# Patient Record
Sex: MALE | HISPANIC OR LATINO | URBAN - NONMETROPOLITAN AREA
[De-identification: names, ages, dates, MRNs, and addresses within clinical notes are randomized per-mention and may not be internally consistent; named-entity substitution may affect disease eponyms.]

---
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
## 2021-06-08 ENCOUNTER — IMPORTED ENCOUNTER (OUTPATIENT)
Dept: URBAN - NONMETROPOLITAN AREA CLINIC 1 | Facility: CLINIC | Age: 21
End: 2021-06-08

## 2021-06-08 PROCEDURE — S0621 ROUTINE OPHTHALMOLOGICAL EXA: HCPCS

## 2021-06-08 NOTE — PATIENT DISCUSSION
Compound Myopic Astigmatism OU-  discussed findings w/patient-  new spectacle Rx issued-  continue to monitor yearly or prnRetinopathy of Prematurity OU-  discussed findings w/patient-  no changes noted at this time stable -  continue to f/u as per Dr. Walt Liu-  RTC 1 year or prn; 's Notes: MR 6/8/2021DFE 6/8/2021

## 2021-07-14 ENCOUNTER — IMPORTED ENCOUNTER (OUTPATIENT)
Dept: URBAN - NONMETROPOLITAN AREA CLINIC 1 | Facility: CLINIC | Age: 21
End: 2021-07-14

## 2021-07-14 PROBLEM — H52.223: Noted: 2021-07-14

## 2021-07-14 PROBLEM — H52.13: Noted: 2021-07-14

## 2021-07-14 PROBLEM — H10.423: Noted: 2021-07-14

## 2021-07-14 PROBLEM — H35.103: Noted: 2021-07-14

## 2021-07-14 PROBLEM — H35.101: Noted: 2021-07-14

## 2021-07-14 PROBLEM — H35.102: Noted: 2021-07-14

## 2021-07-14 PROCEDURE — 92012 INTRM OPH EXAM EST PATIENT: CPT

## 2021-07-14 NOTE — PATIENT DISCUSSION
Ocular Allergies OS>OD-  discussed findings w/patient-  start Pataday OTC at least BID OU-  no further treatment at this time-  continue to monitor as scheduled or prn; 's Notes: MR 6/8/2021DFE 6/8/2021

## 2022-04-09 ASSESSMENT — VISUAL ACUITY
OD_SC: 20/30
OS_SC: 20/40
OS_SC: 20/30
OU_SC: 20/30
OD_SC: 20/25
OD_SC: 20/30+2
OD_SC: 20/25-1
OS_SC: 20/40
OU_CC: J1+
OS_SC: 20/40

## 2022-04-09 ASSESSMENT — TONOMETRY
OS_IOP_MMHG: 13
OD_IOP_MMHG: 15
OD_IOP_MMHG: 12
OS_IOP_MMHG: 12
OD_IOP_MMHG: 13
OS_IOP_MMHG: 15

## 2022-07-28 ENCOUNTER — COMPREHENSIVE EXAM (OUTPATIENT)
Dept: URBAN - NONMETROPOLITAN AREA CLINIC 4 | Facility: CLINIC | Age: 22
End: 2022-07-28

## 2022-07-28 DIAGNOSIS — H52.223: ICD-10-CM

## 2022-07-28 DIAGNOSIS — H52.13: ICD-10-CM

## 2022-07-28 PROCEDURE — S0621 ROUTINE OPHTHALMOLOGICAL EXA: HCPCS

## 2022-07-28 ASSESSMENT — VISUAL ACUITY
OU_CC: 20/25+2
OD_CC: 20/25-2
OS_CC: 20/30

## 2022-07-28 ASSESSMENT — TONOMETRY
OS_IOP_MMHG: 15
OD_IOP_MMHG: 15

## 2022-07-28 NOTE — PATIENT DISCUSSION
Ocular Allergies OS>OD-  discussed findings w/patient-  start Pataday OTC at least BID OU-  no further treatment at this time-  continue to monitor as scheduled or prn; 's Notes: MR 6/8/2021DFE 6/8/2021.

## 2022-07-28 NOTE — PATIENT DISCUSSION
Discussed the use of warm compresses and AT's. Advised patient that if warm compresses and AT's no longer help and is experiencing discomfort to please let us know.

## 2024-01-31 ENCOUNTER — ESTABLISHED PATIENT (OUTPATIENT)
Dept: URBAN - NONMETROPOLITAN AREA CLINIC 4 | Facility: CLINIC | Age: 24
End: 2024-01-31

## 2024-01-31 DIAGNOSIS — H10.423: ICD-10-CM

## 2024-01-31 DIAGNOSIS — H52.223: ICD-10-CM

## 2024-01-31 DIAGNOSIS — H35.103: ICD-10-CM

## 2024-01-31 DIAGNOSIS — Q13.0: ICD-10-CM

## 2024-01-31 DIAGNOSIS — H53.012: ICD-10-CM

## 2024-01-31 DIAGNOSIS — H52.13: ICD-10-CM

## 2024-01-31 PROCEDURE — S0621AEC ROUTINE OPH EXAM INCLUDES REF/ EST PATIENT

## 2024-01-31 ASSESSMENT — VISUAL ACUITY
OD_CC: 20/20-2
OS_CC: 20/40

## 2024-01-31 ASSESSMENT — TONOMETRY
OS_IOP_MMHG: 16
OD_IOP_MMHG: 16

## 2025-05-21 ENCOUNTER — COMPREHENSIVE EXAM (OUTPATIENT)
Age: 25
End: 2025-05-21

## 2025-05-21 DIAGNOSIS — H52.13: ICD-10-CM

## 2025-05-21 DIAGNOSIS — Q13.0: ICD-10-CM

## 2025-05-21 DIAGNOSIS — H35.103: ICD-10-CM

## 2025-05-21 DIAGNOSIS — H10.423: ICD-10-CM

## 2025-05-21 DIAGNOSIS — H52.223: ICD-10-CM

## 2025-05-21 DIAGNOSIS — H53.012: ICD-10-CM

## 2025-05-21 PROCEDURE — S0621AEC ROUTINE OPH EXAM INCLUDES REF/ EST PATIENT
